# Patient Record
Sex: MALE | Race: WHITE
[De-identification: names, ages, dates, MRNs, and addresses within clinical notes are randomized per-mention and may not be internally consistent; named-entity substitution may affect disease eponyms.]

---

## 2017-07-11 ENCOUNTER — HOSPITAL ENCOUNTER (EMERGENCY)
Dept: HOSPITAL 26 - MED | Age: 37
Discharge: HOME | End: 2017-07-11
Payer: COMMERCIAL

## 2017-07-11 VITALS — DIASTOLIC BLOOD PRESSURE: 75 MMHG | SYSTOLIC BLOOD PRESSURE: 117 MMHG

## 2017-07-11 VITALS — HEIGHT: 67 IN | BODY MASS INDEX: 49.44 KG/M2 | WEIGHT: 315 LBS

## 2017-07-11 VITALS — SYSTOLIC BLOOD PRESSURE: 105 MMHG | DIASTOLIC BLOOD PRESSURE: 61 MMHG

## 2017-07-11 DIAGNOSIS — E11.622: ICD-10-CM

## 2017-07-11 DIAGNOSIS — I10: ICD-10-CM

## 2017-07-11 DIAGNOSIS — L97.529: Primary | ICD-10-CM

## 2017-07-11 PROCEDURE — 73660 X-RAY EXAM OF TOE(S): CPT

## 2017-07-11 PROCEDURE — 99284 EMERGENCY DEPT VISIT MOD MDM: CPT

## 2017-07-11 PROCEDURE — 96372 THER/PROPH/DIAG INJ SC/IM: CPT

## 2017-07-11 NOTE — NUR
37Y/M PATIENT PRESENTS TO ED WITH C/O BOTH FEET PAIN  . PT STATES HAVING 
CHRONIC ULCER AND PAIN TO BOTH FEET, HX. OBESITY, DM; SKIN IS PINK/WARM/DRY, 
BOTH FEET ULCER; AAOX4 WITH EVEN AND STEADY GAIT USING CAZARES; LUNGS CLEAR BL; HR 
EVEN AND REGULAR; PT DENIES ANY FEVER, CP, SOB, OR COUGH AT THIS TIME; PATIENT 
STATES PAIN OF 7/10 AT THIS TIME; VSS; PATIENT POSITIONED FOR COMFORT; HOB 
ELEVATED; BEDRAILS UP X2; BED DOWN. ER MD MADE AWARE OF PT STATUS.

## 2018-02-01 ENCOUNTER — HOSPITAL ENCOUNTER (EMERGENCY)
Dept: HOSPITAL 26 - MED | Age: 38
LOS: 1 days | Discharge: HOME | End: 2018-02-02
Payer: COMMERCIAL

## 2018-02-01 VITALS — SYSTOLIC BLOOD PRESSURE: 147 MMHG | DIASTOLIC BLOOD PRESSURE: 69 MMHG

## 2018-02-01 VITALS — WEIGHT: 315 LBS | BODY MASS INDEX: 47.74 KG/M2 | HEIGHT: 68 IN

## 2018-02-01 DIAGNOSIS — E11.9: ICD-10-CM

## 2018-02-01 DIAGNOSIS — M25.551: ICD-10-CM

## 2018-02-01 DIAGNOSIS — R39.15: ICD-10-CM

## 2018-02-01 DIAGNOSIS — Z79.4: ICD-10-CM

## 2018-02-01 DIAGNOSIS — R35.0: ICD-10-CM

## 2018-02-01 DIAGNOSIS — Z79.899: ICD-10-CM

## 2018-02-01 DIAGNOSIS — I10: ICD-10-CM

## 2018-02-01 DIAGNOSIS — R30.0: Primary | ICD-10-CM

## 2018-02-01 DIAGNOSIS — Z79.84: ICD-10-CM

## 2018-02-01 NOTE — NUR
PATIENT IS A 38 Y/O MALE WHO PRESENTS TO THE ED C/O URINARY BURNING. PT STATES, 
"MY DOCTOR SAYS I HAVE AN INFECTION." REPORTS HAVING A HARD TIME PEEING. PT 
REPORTS 10/10 SHARP RIGHT HIP PAIN THAT DOES NOT RADIATE. PT DENIES CP, SOB, 
N/V/D. PT DENIES CP, SOB, N/V/D. PT AAOX4, RR EVEN/UNLABORED. PT REPOSITIONED 
FOR COMFORT, BED IN LOWEST POSITION. ER MD DR. NUÑEZ NOTIFIED. WILL CONTINUE 
TO MONITOR.

## 2018-02-02 VITALS — DIASTOLIC BLOOD PRESSURE: 72 MMHG | SYSTOLIC BLOOD PRESSURE: 139 MMHG

## 2018-02-02 LAB
AMORPH SED URNS QL MICRO: (no result) /HPF
APPEARANCE UR: (no result)
BILIRUB UR QL STRIP: NEGATIVE
COLOR UR: YELLOW
GLUCOSE UR STRIP-MCNC: NEGATIVE MG/DL
HGB UR QL STRIP: NEGATIVE
LEUKOCYTE ESTERASE UR QL STRIP: (no result)
NITRITE UR QL STRIP: NEGATIVE
PH UR STRIP: 5.5 [PH] (ref 5–9)
RBC #/AREA URNS HPF: (no result) /HPF (ref 0–5)
WBC,URINE: (no result) /HPF (ref 0–5)

## 2018-02-02 NOTE — NUR
Patient discharged with v/s stable. Written and verbal after care instructions 
given and explained. Patient alert, oriented and verbalized understanding of 
instructions. Ambulatory with steady gait. All questions addressed prior to 
discharge. ID band removed. Patient advised to follow up with PMD. Rx of CIPRO, 
OMEPRAZOLE AND NORCO given. Patient educated on indication of medication 
including possible reaction and side effects. Opportunity to ask questions 
provided and answered.

## 2018-05-25 ENCOUNTER — HOSPITAL ENCOUNTER (EMERGENCY)
Dept: HOSPITAL 26 - MED | Age: 38
Discharge: HOME | End: 2018-05-25
Payer: COMMERCIAL

## 2018-05-25 VITALS — BODY MASS INDEX: 52.48 KG/M2 | WEIGHT: 315 LBS | HEIGHT: 65 IN

## 2018-05-25 VITALS — SYSTOLIC BLOOD PRESSURE: 117 MMHG | DIASTOLIC BLOOD PRESSURE: 68 MMHG

## 2018-05-25 VITALS — SYSTOLIC BLOOD PRESSURE: 111 MMHG | DIASTOLIC BLOOD PRESSURE: 65 MMHG

## 2018-05-25 DIAGNOSIS — Z79.899: ICD-10-CM

## 2018-05-25 DIAGNOSIS — L03.116: Primary | ICD-10-CM

## 2018-05-25 DIAGNOSIS — I10: ICD-10-CM

## 2018-05-25 DIAGNOSIS — Z79.84: ICD-10-CM

## 2018-05-25 DIAGNOSIS — Z79.4: ICD-10-CM

## 2018-05-25 DIAGNOSIS — E11.9: ICD-10-CM

## 2018-05-25 PROCEDURE — 99283 EMERGENCY DEPT VISIT LOW MDM: CPT

## 2018-05-25 PROCEDURE — 96372 THER/PROPH/DIAG INJ SC/IM: CPT

## 2018-08-20 ENCOUNTER — HOSPITAL ENCOUNTER (EMERGENCY)
Dept: HOSPITAL 26 - MED | Age: 38
Discharge: HOME | End: 2018-08-20
Payer: COMMERCIAL

## 2018-08-20 VITALS — SYSTOLIC BLOOD PRESSURE: 119 MMHG | DIASTOLIC BLOOD PRESSURE: 62 MMHG

## 2018-08-20 VITALS — BODY MASS INDEX: 53.78 KG/M2 | HEIGHT: 64 IN | WEIGHT: 315 LBS

## 2018-08-20 VITALS — SYSTOLIC BLOOD PRESSURE: 108 MMHG | DIASTOLIC BLOOD PRESSURE: 66 MMHG

## 2018-08-20 DIAGNOSIS — I10: ICD-10-CM

## 2018-08-20 DIAGNOSIS — N39.0: Primary | ICD-10-CM

## 2018-08-20 DIAGNOSIS — E11.9: ICD-10-CM

## 2018-08-20 DIAGNOSIS — Z79.899: ICD-10-CM

## 2018-08-20 DIAGNOSIS — Z79.1: ICD-10-CM

## 2018-08-20 LAB
BARBITURATES UR QL SCN: (no result) NG/ML
BENZODIAZ UR QL SCN: (no result) NG/ML
BZE UR QL SCN: (no result) NG/ML
CANNABINOIDS UR QL SCN: (no result) NG/ML
OPIATES UR QL SCN: (no result) NG/ML
PCP UR QL SCN: (no result) NG/ML

## 2018-08-20 PROCEDURE — 87491 CHLMYD TRACH DNA AMP PROBE: CPT

## 2018-08-20 PROCEDURE — 80305 DRUG TEST PRSMV DIR OPT OBS: CPT

## 2018-08-20 PROCEDURE — 96372 THER/PROPH/DIAG INJ SC/IM: CPT

## 2018-08-20 PROCEDURE — 99284 EMERGENCY DEPT VISIT MOD MDM: CPT

## 2018-08-20 PROCEDURE — 81002 URINALYSIS NONAUTO W/O SCOPE: CPT

## 2018-08-20 PROCEDURE — 36415 COLL VENOUS BLD VENIPUNCTURE: CPT

## 2018-08-20 NOTE — NUR
Patient discharged with v/s stable. Written and verbal after care instructions 
given and explained. 

Patient alert, oriented and verbalized understanding of instructions. Wheel 
Chair Assisted with to car. All questions addressed prior to discharge. ID band 
removed. Patient advised to follow up with PMD. Rx of CIPRO 500 MG, PYRIDIUM 
100 MG given. Patient educated on indication of medication including possible 
reaction and side effects. Opportunity to ask questions provided and answered.

## 2018-08-20 NOTE — NUR
PT BIB TO ED WITH C/O DYSURIA. PT STATES PAINFUL URINATION X 1 WK. NO FEVER. 
AAO X4, AMBULATORY VIA W/C ASSIST. SKIN WARM/PINK/DRY. ABDOMEN ROUND, SOFT, NON 
DISTENDED, ACTIVE BOWEL SOUND X4. BLADDER PAIN, UNABLE TO URIANTE AT THIS TIME. 
VSS, ER MD MADE AWARE OF PT STATUS. WILL CONTINUE TO MONITOR.

## 2018-10-25 ENCOUNTER — HOSPITAL ENCOUNTER (EMERGENCY)
Dept: HOSPITAL 26 - MED | Age: 38
Discharge: HOME | End: 2018-10-25
Payer: COMMERCIAL

## 2018-10-25 VITALS — DIASTOLIC BLOOD PRESSURE: 76 MMHG | SYSTOLIC BLOOD PRESSURE: 128 MMHG

## 2018-10-25 VITALS — SYSTOLIC BLOOD PRESSURE: 122 MMHG | DIASTOLIC BLOOD PRESSURE: 73 MMHG

## 2018-10-25 VITALS — HEIGHT: 65 IN | WEIGHT: 315 LBS | BODY MASS INDEX: 52.48 KG/M2

## 2018-10-25 DIAGNOSIS — I50.9: ICD-10-CM

## 2018-10-25 DIAGNOSIS — S00.93XA: Primary | ICD-10-CM
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
## 2018-10-25 DIAGNOSIS — E11.9: ICD-10-CM

## 2018-10-25 DIAGNOSIS — S20.219A: ICD-10-CM
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
## 2018-10-25 DIAGNOSIS — I11.0: ICD-10-CM

## 2018-10-25 DIAGNOSIS — Z79.899: ICD-10-CM

## 2018-10-25 DIAGNOSIS — Z79.1: ICD-10-CM

## 2019-01-07 ENCOUNTER — HOSPITAL ENCOUNTER (INPATIENT)
Dept: HOSPITAL 26 - MED | Age: 39
LOS: 2 days | Discharge: HOME HEALTH | DRG: 720 | End: 2019-01-09
Attending: GENERAL PRACTICE | Admitting: GENERAL PRACTICE
Payer: COMMERCIAL

## 2019-01-07 VITALS — DIASTOLIC BLOOD PRESSURE: 68 MMHG | SYSTOLIC BLOOD PRESSURE: 110 MMHG

## 2019-01-07 VITALS — WEIGHT: 315 LBS | HEIGHT: 67 IN | BODY MASS INDEX: 49.44 KG/M2

## 2019-01-07 VITALS — SYSTOLIC BLOOD PRESSURE: 177 MMHG | DIASTOLIC BLOOD PRESSURE: 70 MMHG

## 2019-01-07 DIAGNOSIS — L60.0: ICD-10-CM

## 2019-01-07 DIAGNOSIS — A41.9: Primary | ICD-10-CM

## 2019-01-07 DIAGNOSIS — D68.59: ICD-10-CM

## 2019-01-07 DIAGNOSIS — E05.90: ICD-10-CM

## 2019-01-07 DIAGNOSIS — E43: ICD-10-CM

## 2019-01-07 DIAGNOSIS — I70.203: ICD-10-CM

## 2019-01-07 DIAGNOSIS — L03.031: ICD-10-CM

## 2019-01-07 DIAGNOSIS — Z79.84: ICD-10-CM

## 2019-01-07 DIAGNOSIS — E11.69: ICD-10-CM

## 2019-01-07 DIAGNOSIS — I16.0: ICD-10-CM

## 2019-01-07 DIAGNOSIS — E66.01: ICD-10-CM

## 2019-01-07 DIAGNOSIS — I11.0: ICD-10-CM

## 2019-01-07 DIAGNOSIS — D50.9: ICD-10-CM

## 2019-01-07 DIAGNOSIS — I50.43: ICD-10-CM

## 2019-01-07 DIAGNOSIS — E83.42: ICD-10-CM

## 2019-01-07 DIAGNOSIS — L03.115: ICD-10-CM

## 2019-01-07 DIAGNOSIS — F11.10: ICD-10-CM

## 2019-01-07 DIAGNOSIS — L97.529: ICD-10-CM

## 2019-01-07 DIAGNOSIS — Z79.4: ICD-10-CM

## 2019-01-07 DIAGNOSIS — Z82.49: ICD-10-CM

## 2019-01-07 DIAGNOSIS — F15.10: ICD-10-CM

## 2019-01-07 DIAGNOSIS — Z79.899: ICD-10-CM

## 2019-01-07 DIAGNOSIS — Z83.3: ICD-10-CM

## 2019-01-07 DIAGNOSIS — E78.5: ICD-10-CM

## 2019-01-07 DIAGNOSIS — E11.65: ICD-10-CM

## 2019-01-07 LAB
ALBUMIN FLD-MCNC: 2.5 G/DL (ref 3.4–5)
AMYLASE SERPL-CCNC: 18 U/L (ref 25–115)
ANION GAP SERPL CALCULATED.3IONS-SCNC: 8.6 MMOL/L (ref 8–16)
AST SERPL-CCNC: 43 U/L (ref 15–37)
BILIRUB SERPL-MCNC: 0.4 MG/DL (ref 0–1)
BUN SERPL-MCNC: 23 MG/DL (ref 7–18)
CHLORIDE SERPL-SCNC: 99 MMOL/L (ref 98–107)
CO2 SERPL-SCNC: 27.9 MMOL/L (ref 21–32)
CREAT SERPL-MCNC: 1.3 MG/DL (ref 0.7–1.3)
EOSINOPHIL NFR BLD MANUAL: 1 % (ref 0–4)
ERYTHROCYTE [DISTWIDTH] IN BLOOD BY AUTOMATED COUNT: 15.2 % (ref 11.6–13.7)
GFR SERPL CREATININE-BSD FRML MDRD: 80 ML/MIN (ref 90–?)
GLUCOSE SERPL-MCNC: 371 MG/DL (ref 74–106)
HCT VFR BLD AUTO: 30.5 % (ref 36–52)
HGB BLD-MCNC: 9.3 G/DL (ref 12–18)
IRON SERPL-MCNC: 16 UG/DL (ref 35–150)
LIPASE SERPL-CCNC: 150 U/L (ref 73–393)
LYMPHOCYTES NFR BLD MANUAL: 6 % (ref 20–46)
MAGNESIUM SERPL-MCNC: 1.7 MG/DL (ref 1.8–2.4)
MCH RBC QN AUTO: 25 PG (ref 27–31)
MCHC RBC AUTO-ENTMCNC: 31 G/DL (ref 33–37)
MCV RBC AUTO: 82.5 FL (ref 80–94)
MONOCYTES NFR BLD MANUAL: 1 % (ref 5–12)
PHOSPHATE SERPL-MCNC: 2.9 MG/DL (ref 2.5–4.9)
PLATELET # BLD AUTO: 186 K/UL (ref 140–450)
POTASSIUM SERPL-SCNC: 4.5 MMOL/L (ref 3.5–5.1)
PROTHROMBIN TIME: 10.8 SECS (ref 10.8–13.4)
RBC # BLD AUTO: 3.69 MIL/UL (ref 4.2–6.1)
SODIUM SERPL-SCNC: 131 MMOL/L (ref 136–145)
TIBC SERPL-MCNC: 244 UG/DL (ref 250–450)
TSH SERPL DL<=0.05 MIU/L-ACNC: 0.27 UIU/ML (ref 0.34–3.74)
WBC # BLD AUTO: 17 K/UL (ref 4.8–10.8)

## 2019-01-07 RX ADMIN — HYDROCODONE BITARTRATE AND ACETAMINOPHEN PRN TAB: 5; 325 TABLET ORAL at 21:18

## 2019-01-07 RX ADMIN — DEXTROSE SCH MLS/HR: 5 SOLUTION INTRAVENOUS at 17:00

## 2019-01-07 RX ADMIN — SODIUM CHLORIDE SCH MLS/HR: 9 INJECTION, SOLUTION INTRAVENOUS at 22:05

## 2019-01-07 RX ADMIN — Medication SCH DEV: at 21:33

## 2019-01-07 RX ADMIN — DEXTROSE SCH MLS/HR: 5 SOLUTION INTRAVENOUS at 16:42

## 2019-01-07 RX ADMIN — DEXTROSE SCH MLS/HR: 5 SOLUTION INTRAVENOUS at 16:44

## 2019-01-07 NOTE — NUR
38/M BIB FAMILY C/O RIGHT LOWER LEG  REDNESS & SWELLING X 4 DAYS. DENIES 
INJURY.DENIES N/V/D; SKIN IS PINK/WARM/DRY; AAOX4 , AMB WITH CANE. PATIENT 
STATES RIGHT LEG PAIN OF 9/10 AT THIS TIME. PATIENT POSITIONED FOR COMFORT; RLL 
ELEVATED; BEDRAILS UP X2; BED DOWN. ER MD MADE AWARE OF PT STATUS.

## 2019-01-07 NOTE — NUR
RECEIVED FROM ER VIA amBXCHON. PT WITH R LOWER EXTREMITY CELLULITIS, RED AND SWOLLEN. PT 
MORBID OBESITY, PT AMBULATORY WITH CANE. WITH ASSISTANCE DUE TO PAIN ON R LE. FALL RISK DUE 
TO PAIN IN R LE. PLACED IN LOW BED POSITION, CALL LIGHT W/IN REACH. ORIENTED TO UNIT. PT 
VERBALIZED UNDERSTANDING.

## 2019-01-07 NOTE — NUR
C/O BL LOWER LEG SWELLING/PAIN X 4 DAYS. DENIES INJURY. PT AMBULATES WITH A 
CANE

HX; DM, HTN

RX; LASIX, INSULIN

## 2019-01-07 NOTE — NUR
WAS INFORMED BY PHARMACIST THAT FERRITIN GLUCONATE HAS TO BE PREPARED, THAT IT CAN'T BE 
GIVEN TONIGHT. DR. ANDREW AWARE

## 2019-01-07 NOTE — NUR
PT HAS HARD STICK; NOTIFIED DR ARTEAGA.

-------------------------------------------------------------------------------

Addendum: 01/07/19 at 1802 by [email or web address]

-------------------------------------------------------------------------------

TRIED MULTIPLE TIMES.

## 2019-01-07 NOTE — NUR
Patient will be admitted to care of DR SHEEHAN . Admited to MED SURG.  Will go to 
[id number]. Belongings list completed.  Report to ISHAN SHAFFER.

## 2019-01-07 NOTE — NUR
PT ON 10/10 R LE PAIN. PT SAID THAT NORCO IS OK EVEN WITRH 10/10 PAIN. HE DOES NOT WANT 
HIGHER PAIN MED FOR NOW. WILL INFORM DR, HE SAID IF HE NEEDS MORE PAIN MED.

## 2019-01-08 VITALS — DIASTOLIC BLOOD PRESSURE: 60 MMHG | SYSTOLIC BLOOD PRESSURE: 106 MMHG

## 2019-01-08 VITALS — SYSTOLIC BLOOD PRESSURE: 112 MMHG | DIASTOLIC BLOOD PRESSURE: 71 MMHG

## 2019-01-08 VITALS — SYSTOLIC BLOOD PRESSURE: 106 MMHG | DIASTOLIC BLOOD PRESSURE: 61 MMHG

## 2019-01-08 VITALS — SYSTOLIC BLOOD PRESSURE: 105 MMHG | DIASTOLIC BLOOD PRESSURE: 56 MMHG

## 2019-01-08 LAB
ANION GAP SERPL CALCULATED.3IONS-SCNC: 12 MMOL/L (ref 8–16)
BARBITURATES UR QL SCN: (no result) NG/ML
BASOPHILS # BLD AUTO: 0 K/UL (ref 0–0.22)
BASOPHILS NFR BLD AUTO: 0.2 % (ref 0–2)
BENZODIAZ UR QL SCN: (no result) NG/ML
BUN SERPL-MCNC: 18 MG/DL (ref 7–18)
BZE UR QL SCN: (no result) NG/ML
CANNABINOIDS UR QL SCN: (no result) NG/ML
CHLORIDE SERPL-SCNC: 99 MMOL/L (ref 98–107)
CHOLEST/HDLC SERPL: 7.3 {RATIO} (ref 1–4.5)
CO2 SERPL-SCNC: 28.5 MMOL/L (ref 21–32)
CREAT SERPL-MCNC: 1.1 MG/DL (ref 0.7–1.3)
EOSINOPHIL # BLD AUTO: 0.2 K/UL (ref 0–0.4)
EOSINOPHIL NFR BLD AUTO: 1.4 % (ref 0–4)
ERYTHROCYTE [DISTWIDTH] IN BLOOD BY AUTOMATED COUNT: 15.3 % (ref 11.6–13.7)
FERRITIN SERPL-MCNC: 125 NG/ML (ref 30–400)
FOLATE SERPL-MCNC: 7 NG/ML (ref 3–?)
GFR SERPL CREATININE-BSD FRML MDRD: 97 ML/MIN (ref 90–?)
GLUCOSE SERPL-MCNC: 226 MG/DL (ref 74–106)
HCT VFR BLD AUTO: 29 % (ref 36–52)
HDLC SERPL-MCNC: 15 MG/DL (ref 40–60)
HGB BLD-MCNC: 9 G/DL (ref 12–18)
LDLC SERPL CALC-MCNC: 63 MG/DL (ref 60–100)
LYMPHOCYTES # BLD AUTO: 2 K/UL (ref 2–11.5)
LYMPHOCYTES NFR BLD AUTO: 16.1 % (ref 20.5–51.1)
MAGNESIUM SERPL-MCNC: 1.7 MG/DL (ref 1.8–2.4)
MCH RBC QN AUTO: 25 PG (ref 27–31)
MCHC RBC AUTO-ENTMCNC: 31 G/DL (ref 33–37)
MCV RBC AUTO: 82.1 FL (ref 80–94)
MONOCYTES # BLD AUTO: 0.9 K/UL (ref 0.8–1)
MONOCYTES NFR BLD AUTO: 7.1 % (ref 1.7–9.3)
NEUTROPHILS # BLD AUTO: 9.3 K/UL (ref 1.8–7.7)
NEUTROPHILS NFR BLD AUTO: 75.2 % (ref 42.2–75.2)
OPIATES UR QL SCN: (no result) NG/ML
PCP UR QL SCN: (no result) NG/ML
PHOSPHATE SERPL-MCNC: 2.6 MG/DL (ref 2.5–4.9)
PLATELET # BLD AUTO: 174 K/UL (ref 140–450)
POTASSIUM SERPL-SCNC: 4.5 MMOL/L (ref 3.5–5.1)
RBC # BLD AUTO: 3.53 MIL/UL (ref 4.2–6.1)
SODIUM SERPL-SCNC: 135 MMOL/L (ref 136–145)
TRANSFERRIN SERPL-MCNC: 209 MG/DL (ref 200–370)
TRIGL SERPL-MCNC: 164 MG/DL (ref 30–150)
VIT B12 SERPL-MCNC: 425 PG/ML (ref 232–1245)
WBC # BLD AUTO: 12.4 K/UL (ref 4.8–10.8)

## 2019-01-08 RX ADMIN — MAGNESIUM OXIDE TAB 400 MG (241.3 MG ELEMENTAL MG) SCH MG: 400 (241.3 MG) TAB at 21:55

## 2019-01-08 RX ADMIN — HYDROCODONE BITARTRATE AND ACETAMINOPHEN PRN TAB: 5; 325 TABLET ORAL at 06:46

## 2019-01-08 RX ADMIN — Medication SCH DEV: at 21:46

## 2019-01-08 RX ADMIN — Medication SCH DEV: at 05:38

## 2019-01-08 RX ADMIN — Medication SCH EACH: at 09:40

## 2019-01-08 RX ADMIN — LISINOPRIL SCH MG: 10 TABLET ORAL at 09:40

## 2019-01-08 RX ADMIN — Medication SCH DEV: at 16:30

## 2019-01-08 RX ADMIN — FUROSEMIDE SCH MG: 10 INJECTION, SOLUTION INTRAMUSCULAR; INTRAVENOUS at 09:40

## 2019-01-08 RX ADMIN — SODIUM CHLORIDE SCH MLS/HR: 9 INJECTION, SOLUTION INTRAVENOUS at 06:16

## 2019-01-08 RX ADMIN — INSULIN LISPRO PRN UNITS: 100 INJECTION, SOLUTION INTRAVENOUS; SUBCUTANEOUS at 05:43

## 2019-01-08 RX ADMIN — INSULIN LISPRO PRN UNITS: 100 INJECTION, SOLUTION INTRAVENOUS; SUBCUTANEOUS at 18:32

## 2019-01-08 RX ADMIN — Medication SCH DEV: at 11:30

## 2019-01-08 RX ADMIN — INSULIN LISPRO PRN UNITS: 100 INJECTION, SOLUTION INTRAVENOUS; SUBCUTANEOUS at 12:49

## 2019-01-08 RX ADMIN — INSULIN LISPRO PRN UNITS: 100 INJECTION, SOLUTION INTRAVENOUS; SUBCUTANEOUS at 21:52

## 2019-01-08 RX ADMIN — ASPIRIN TAB DELAYED RELEASE 81 MG SCH MG: 81 TABLET DELAYED RESPONSE at 09:40

## 2019-01-08 RX ADMIN — METHADONE HYDROCHLORIDE SCH MG: 10 TABLET ORAL at 13:13

## 2019-01-08 RX ADMIN — DEXTROSE SCH MLS/HR: 5 SOLUTION INTRAVENOUS at 17:38

## 2019-01-08 RX ADMIN — DEXTROSE SCH MLS/HR: 5 SOLUTION INTRAVENOUS at 11:15

## 2019-01-08 NOTE — NUR
PT FOUND AMBULATING TO RESTROOM ON HIS OWN. I REENFORCED TEACHING ON THE IMPORTANCE OF HIM 
USING HIS CALL LIGHT TO CALL FOR HELP WHEN GETTING OUT OF BED DUE TO HIS UNSTEADINESS. PT 
VERBALIZED UNDERSTANDING. URINAL AT BEDSIDE FOR EASE OF VOIDING. ALL OTHER NEEDS MET AT THIS 
TIME. WILL CONTINUE TO ROUND FREQUENTLY ON PT. CALL LIGHT WITHIN REACH, BED IN LOW POSITION.

## 2019-01-08 NOTE — NUR
PATIENT HAS BEEN SCREENED AND CATEGORIZED AS HIGH NUTRITION RISK. PATIENT WILL BE SEEN 
WITHIN 1-2 DAYS OF ADMISSION.



01/08/19-01/09/19



CARMEN KELLEY RD

## 2019-01-08 NOTE — NUR
PT C/O OF 9/10 PAIN. DR. ANDREW AWARE NORCO GIVEN DESPITE PAIN INTENSITY. PT SAID THAT NORCO 
SUFFICIENT FOR HIM AND THAT HE FEELS PAIN ONLY WHEN HE TRIES TO AMBULATE. HAD PAIN WHEN HE 
ATTEMPTED TO PUT WEIGHT ON R LEG.

## 2019-01-08 NOTE — NUR
RECEIVED REPORT FROM NIGHT SHIFT NURSE. PT WITH R LOWER EXTREMITY CELLULITIS, RED AND 
SWOLLEN. PT MORBIDLY OBESE. LEFT FA 24G PATENT AND INTACT. PT AMBULATORY WITH CANE WITH 
ASSISTANCE DUE TO PAIN ON R LE. FALL RISK DUE TO PAIN IN R LE. PLACED IN LOW BED POSITION, 
CALL LIGHT W/IN REACH. PT VERBALIZED UNDERSTANDING.

## 2019-01-08 NOTE — NUR
PT SLEEPING IN BED. NO SIGNS OF DISTRESS NOTED. WILL CONTINUE TO MONITOR CLOSELY. CALL LIGHT 
WITHIN REACH, BED IN LOWEST POSITION.

## 2019-01-08 NOTE — NUR
PT SLEEPING IN BED. NO SIGNS OF DISTRESS NOTED AT THIS TIME. WILL CONTINUE TO ROUND 
FREQUENTLY. CALL LIGHT WITHIN REACH, BED IN LOW POSITION.

## 2019-01-08 NOTE — NUR
PT RESTING IN BED WATCHING TV. NO COMPLAINS OF PAIN AT THIS TIME. PT RUNNING VANCOMYCIN AND 
TOLERATING WELL. WILL ROUND FREQUENTLY. CALL LIGHT WITHIN REACH.

## 2019-01-08 NOTE — NUR
RECEIVED BEDSIDE REPORT FROM A PT WITH R LOWER EXTREMITY CELLULITIS, RED AND SWOLLEN.NO 
COMPLAINTS AT THIS TIME. PT MORBID OBESITY, PT PT LYING ON BED THIS TIME.INFORMED PT TO CALL 
NURSE IF WANTING TO AMBULATE,PT VERBALIZED UNDERSTANDING. FALL RISK DUE TO PAIN IN R LE. 
PLACED IN LOW BED POSITION, CALL LIGHT W/IN REACH.

## 2019-01-08 NOTE — NUR
1/8/19 RD INITIAL ASSESSMENT COMPLETED



PLEASE REFER TO NUTRITION ASSESSMENT UNDER CARE ACTIVITY FOR ESTIMATED NUTRITIONAL NEEDS. 



1. CONTINUE CARDIAC 60 GM CCHO DIET AS TOLERATED 

2. DIETITIAN PROVIDED NUTRITION EDUCATION ON A CARDIAC HEALTHY DIET

3. RD TO FOLLOW-UP 3-5 DAYS, MODERATE RISK 



CARMEN KELLEY RD

## 2019-01-09 VITALS — DIASTOLIC BLOOD PRESSURE: 60 MMHG | SYSTOLIC BLOOD PRESSURE: 105 MMHG

## 2019-01-09 VITALS — SYSTOLIC BLOOD PRESSURE: 116 MMHG | DIASTOLIC BLOOD PRESSURE: 61 MMHG

## 2019-01-09 VITALS — SYSTOLIC BLOOD PRESSURE: 106 MMHG | DIASTOLIC BLOOD PRESSURE: 61 MMHG

## 2019-01-09 LAB
ANION GAP SERPL CALCULATED.3IONS-SCNC: 14.9 MMOL/L (ref 8–16)
APPEARANCE UR: CLEAR
BASOPHILS # BLD AUTO: 0 K/UL (ref 0–0.22)
BASOPHILS NFR BLD AUTO: 0.2 % (ref 0–2)
BILIRUB UR QL STRIP: NEGATIVE
BUN SERPL-MCNC: 15 MG/DL (ref 7–18)
CHLORIDE SERPL-SCNC: 98 MMOL/L (ref 98–107)
CO2 SERPL-SCNC: 27.8 MMOL/L (ref 21–32)
COLOR UR: YELLOW
CREAT SERPL-MCNC: 1.1 MG/DL (ref 0.7–1.3)
EOSINOPHIL # BLD AUTO: 0.1 K/UL (ref 0–0.4)
EOSINOPHIL NFR BLD AUTO: 1 % (ref 0–4)
ERYTHROCYTE [DISTWIDTH] IN BLOOD BY AUTOMATED COUNT: 15.4 % (ref 11.6–13.7)
GFR SERPL CREATININE-BSD FRML MDRD: 97 ML/MIN (ref 90–?)
GLUCOSE SERPL-MCNC: 216 MG/DL (ref 74–106)
GLUCOSE UR STRIP-MCNC: NEGATIVE MG/DL
HCT VFR BLD AUTO: 32.1 % (ref 36–52)
HGB BLD-MCNC: 9.8 G/DL (ref 12–18)
HGB UR QL STRIP: NEGATIVE
LEUKOCYTE ESTERASE UR QL STRIP: NEGATIVE
LYMPHOCYTES # BLD AUTO: 3.1 K/UL (ref 2–11.5)
LYMPHOCYTES NFR BLD AUTO: 22.6 % (ref 20.5–51.1)
MAGNESIUM SERPL-MCNC: 1.7 MG/DL (ref 1.8–2.4)
MCH RBC QN AUTO: 25 PG (ref 27–31)
MCHC RBC AUTO-ENTMCNC: 31 G/DL (ref 33–37)
MCV RBC AUTO: 82.8 FL (ref 80–94)
MONOCYTES # BLD AUTO: 0.9 K/UL (ref 0.8–1)
MONOCYTES NFR BLD AUTO: 6.7 % (ref 1.7–9.3)
NEUTROPHILS # BLD AUTO: 9.7 K/UL (ref 1.8–7.7)
NEUTROPHILS NFR BLD AUTO: 69.5 % (ref 42.2–75.2)
NITRITE UR QL STRIP: NEGATIVE
PH UR STRIP: 6.5 [PH] (ref 5–9)
PHOSPHATE SERPL-MCNC: 3.1 MG/DL (ref 2.5–4.9)
PLATELET # BLD AUTO: 235 K/UL (ref 140–450)
POTASSIUM SERPL-SCNC: 4.7 MMOL/L (ref 3.5–5.1)
RBC # BLD AUTO: 3.88 MIL/UL (ref 4.2–6.1)
SODIUM SERPL-SCNC: 136 MMOL/L (ref 136–145)
WBC # BLD AUTO: 13.9 K/UL (ref 4.8–10.8)

## 2019-01-09 RX ADMIN — Medication SCH DEV: at 06:48

## 2019-01-09 RX ADMIN — FUROSEMIDE SCH MG: 10 INJECTION, SOLUTION INTRAMUSCULAR; INTRAVENOUS at 08:41

## 2019-01-09 RX ADMIN — INSULIN LISPRO PRN UNITS: 100 INJECTION, SOLUTION INTRAVENOUS; SUBCUTANEOUS at 06:49

## 2019-01-09 RX ADMIN — SODIUM CHLORIDE SCH MLS/HR: 9 INJECTION, SOLUTION INTRAVENOUS at 06:16

## 2019-01-09 RX ADMIN — Medication SCH DEV: at 11:30

## 2019-01-09 RX ADMIN — Medication SCH EACH: at 08:42

## 2019-01-09 RX ADMIN — MAGNESIUM OXIDE TAB 400 MG (241.3 MG ELEMENTAL MG) SCH MG: 400 (241.3 MG) TAB at 08:42

## 2019-01-09 RX ADMIN — ASPIRIN TAB DELAYED RELEASE 81 MG SCH MG: 81 TABLET DELAYED RESPONSE at 08:42

## 2019-01-09 RX ADMIN — DEXTROSE SCH MLS/HR: 5 SOLUTION INTRAVENOUS at 11:22

## 2019-01-09 RX ADMIN — INSULIN LISPRO PRN UNITS: 100 INJECTION, SOLUTION INTRAVENOUS; SUBCUTANEOUS at 13:34

## 2019-01-09 RX ADMIN — DEXTROSE SCH MLS/HR: 5 SOLUTION INTRAVENOUS at 02:43

## 2019-01-09 RX ADMIN — LISINOPRIL SCH MG: 10 TABLET ORAL at 08:44

## 2019-01-09 RX ADMIN — HYDROCODONE BITARTRATE AND ACETAMINOPHEN PRN TAB: 5; 325 TABLET ORAL at 11:14

## 2019-01-09 RX ADMIN — METHADONE HYDROCHLORIDE SCH MG: 10 TABLET ORAL at 12:24

## 2019-01-09 NOTE — NUR
LAB WAS HERE, TRIED TO GET VANCO TROUGH BUT FAILED, 4X PT WAS POKED, UNTIL PT C/O OF PAIN 
PER .  CALLED TO LET NURSE KNOW. DR. ANDREW AWARE. WILL WAIT FOR VANCO 
TROUGH BEFORE VANCO GIVEN PER DR. ANDREW

## 2019-01-09 NOTE — NUR
ENDORSED REPORT TO AM SHIFT NURSE FOR CONTINUITY OF CARE. PT NO COMPLAINTS AT THIS TIME, IN 
STABLE CONDITION.

## 2019-01-09 NOTE — NUR
RECEIVED REPORT FROM NIGHT SHIFT NURSE. PT SLEEPING IN BED. REINTRODUCED SELF TO PT. PT WITH 
R LOWER EXTREMITY CELLULITIS, RED AND SWOLLEN. PT MORBIDLY OBESE. LEFT FA 24G PATENT AND 
INTACT. PT AMBULATORY WITH CANE WITH ASSISTANCE DUE TO PAIN ON R LE. FALL RISK DUE TO PAIN 
IN R LE. PLACED IN LOW BED POSITION, CALL LIGHT W/IN REACH. PT VERBALIZED UNDERSTANDING.

## 2019-01-09 NOTE — NUR
PT SLEEPING IN BED. NO SIGNS OF DISTRESS NOTED. WILL CONTINUE TO ROUND FREQUENTLY. BED IN 
LOWEST POSITION, CALL LIGHT WITHIN REACH.

## 2019-01-09 NOTE — NUR
CALLED SU THE ADMITTING MANAGER AND LEFT A MESSAGE REGARDING PT'S CHANGE OF ADDRESS. PT 
ADDRESS IN DEMOGRAPHICS IS INCORRECT PER PT AND SISTER. [place], [address].

## 2019-01-09 NOTE — NUR
SPOKE WITH DR JAMES.  SHE IS ASKING FOR AUTH FOR DR. BURDICK TO SEE THE PATIENT.    I CALLED 
MAXIMO FROM [place], [phone].    SHE SAID TO CALL [facility], 
LUCI [phone].   I SPOKE WITH LUCI AND SHE SAID TO CALL JOHN [phone] 
.    I CALLED JOHN AND SHE SAID SHE CANNOT AUTHORIZE DR. BURDICK.   SHE SAID TO USE DR. FELICIA CANO, [phone].   SHE SAID THE AUTH FOR HIM WILL BE [id number].   SHE 
ASKED ME TO FAX THE FACE SHEET AND THE ORDER TO HER [phone].   I INFORMED DR. JAMES.  
 I RECEIVED A COPY OF THE AUTH AND GAVE IT TO DR. JAMES.

I SPOKE WITH MAXIMO FROM [place] ABOUT HOME HEALTH ORDER FOR SAFETY EVAL.   SHE SAID TO TRY 
THE HOME HEALTHS THAT THEY ARE CONTRACTED WITH.

CALLED [facility], NO

[facility]

CALLED [facility] AND FAXED ORDER AND FACE SHEET AND H&P TO THEM.

## 2019-01-09 NOTE — NUR
RECEIVED A CALL FROM [facility].   THE AUTH FOR CORY IS [id number].   I CALLED JANES AT 
[facility] AND SHE IS REVIEWING THE PACKET ON THIS PATIENT AND SHE WILL LET ME KNOW IF THEY 
CAN ACCEPT THE PATIENT.

-------------------------------------------------------------------------------

Addendum: 01/09/19 at 1726 by Gladis Pitts 

-------------------------------------------------------------------------------

THE AUTH FROM [facility] IS FOR ANY HOME HEALTH THAT CAN TAKE THE PATIENT.

## 2019-01-09 NOTE — NUR
ADMINISTERED MORNING MEDS TO PT. PT TOLERATED THEM WELL. NO OTHER NEEDS AT THIS MOMENT. PT 
EDUCATION REENFORCED ON THE IMPORTANCE OF CALLING FOR HELP WHEN GETTING OUT OF BED DUE TO 
UNSTEADINESS ON FEET. PT VERBALIZED UNDERSTANDING. WILL CONTINUE TO ROUND FREQUENTLY. CALL 
LIGHT WITHIN REACH. BED IN LOWEST POSITION FOR ICU BED.

## 2019-01-09 NOTE — NUR
PT PRESCRIPTION MEDIATIONS DELIVERED TO FLOOR BY [facility] [facility]. PRESCRIPTIONS 
AUTOMATICALLY TAKEN TO PHARMACY FOR SAFEKEEPING PER VERONICA, PHARMACIST, INSTRUCTIONS. 
WILL PICKUP MEDS WHEN PT IS DISCHARGED FOR PT TO TAKE HOME.

## 2019-01-09 NOTE — NUR
PER PT, HE FELL EVEN THOUGH HE WAS INSTRUCTED TO USE HIS CALL LIGHT IF NEEDED TO USE 
RESTROOM. URINAL WAS GIVEN FOR PT. PT FOUND ON THE FLOOR BY OTHER NURSE. PT VITAL SIGNS ARE 
STABLE. IV STILL INTACT AND PATENT. PT STATES THAT HE HURT HIS WRIST. DENIES HITTING HIS 
HEAD. NO LOSS OF CONSCIOUSNESS.  WAS CALLED TO BEDSIDE FOR FURTHER EVALUATION. PT 
ASSISTED BACK TO BED BY THREE NURSES. PT INSTRUCTED AGAIN TO USE THE CALL LIGHT. CALL LIGHT 
WITHIN REACH. WILL CONTINUE TO MONITOR PT.

## 2019-01-09 NOTE — NUR
RECEIVED CALL FROM [facility] THEY CANNOT TAKE THE PATIENT WITH THE IPA DANIEL JUNIOR.

I CALLED [facility], NO

CALLED [facility] NO

CALLED [facility], NOT TAKING [facility].

CALLED [facility], [phone].   FAXED INFORMATION AND ORDER TO THEM [phone].  
 JESSICA AT [facility] SAID THEY NEED AN AUTHORIZATION FROM [facility] FIRST.   CALLED [employer] AND 
SPOKE WITH MICHAEL

## 2019-01-09 NOTE — NUR
PT DISCHARGED HOME WITH HOME HEALTH. PT NOT ABLE TO SIGN DISCHARGE PAPERWORK DUE TO 
CONDITION. DISCHARGE EDUCATION GIVEN TO PT. PRESCRIPTION ALSO INCLUDED IN DISCHARGE FOLDER. 
DR. JAMES AT BEDSIDE DURING TEACHING. REMOVED IV, WITH TIP INTACT. CUT WRISTBANDS OFF PT. ALL 
BELONGINGS TAKEN WITH HIM. PT LEFT IN STABLE CONDITION, VERBALIZED UNDERSTANDING OF 
IMPORTANCE OF FOLLOWING UP WITH PCP, PEDIATRIST, AND ORTHO SPECIALIST FOR WRIST FRACTURE. 
ALSO VERBALIZED UNDERSTANDING ON MEDICATION REGIMEN. PER, PT HE ADMITS FAULT TO FALL EARLIER 
IN THE DAY. WHEELED PT OUT TO [employer] CAR. 

-------------------------------------------------------------------------------

Addendum: 01/09/19 at 2049 by Mildred Harrison RN

-------------------------------------------------------------------------------

PT DISCHARGED HOME WITH HOME HEALTH. PT NOT ABLE TO SIGN DISCHARGE PAPERWORK DUE TO 
CONDITION. DISCHARGE EDUCATION GIVEN TO PT. PRESCRIPTION ALSO INCLUDED IN DISCHARGE FOLDER. 
DR. JAMES AT BEDSIDE DURING TEACHING. REMOVED IV, WITH TIP INTACT. CUT WRISTBANDS OFF PT. ALL 
BELONGINGS TAKEN WITH HIM. PRESCRIPTIONS LEFT IN PHARMACY ALSO GIVEN TO PT. PT LEFT IN 
STABLE CONDITION, VERBALIZED UNDERSTANDING OF IMPORTANCE OF FOLLOWING UP WITH PCP, 
PEDIATRIST, AND ORTHO SPECIALIST FOR WRIST FRACTURE. ALSO VERBALIZED UNDERSTANDING ON 
MEDICATION REGIMEN. PER, PT HE ADMITS FAULT TO FALL EARLIER IN THE DAY. WHEELED PT OUT TO 
[employer] CAR.

## 2019-01-09 NOTE — NUR
PER PHARMACIST VANCO TROUGH MAX LEVEL IS 20, SO GO AHEAD WITH THE VANCO ADMINISTRATION.DR. ANDREW AWARE OF THE RESULT AND AWARE THAT VANCO IS GOING TO BE ADMINISTERED.

## 2019-01-09 NOTE — NUR
PT DAUGHTER AT BEDSIDE. PT VERBALIZES NO SOB OR DISTRESS. ALL NEEDS MET AT THIS TIME. CALL 
LIGHT WITHIN REACH, BED IN LOW POSITION.

## 2019-01-09 NOTE — NUR
RECEIVED A CALL FROM FROM [facility].   THEY HAVE NO NURSE TO SEE THIS PATIENT AND 
CANNOT ACCPET THE PATIENT.

## 2019-01-09 NOTE — NUR
CHART REVIEW DONE. [facility]  [address], [place], [place]    Authorization for Surgical Operation and Procedure                               I hereby authorize * Shelly Tilley  *, my physician and his/her assistants (if applicable), which may include medical students, residents, and/or fellows, to perform the following surgical operation/ procedure and administer such anesthesia as may be determined necessary by my physician: Operation/Procedure name  percutaneous liver biopsy   on Mica Bernabe   2.   I recognize that during the surgical operation/procedure, unforeseen conditions may necessitate additional or different procedures than those listed above.  I, therefore, further authorize and request that the above-named surgeon, assistants, or designees perform such procedures as are, in their judgment, necessary and desirable.    3.   My surgeon/physician has discussed prior to my surgery the potential benefits, risks and side effects of this procedure; the likelihood of achieving goals; and potential problems that might occur during recuperation.  They also discussed reasonable alternatives to the procedure, including risks, benefits, and side effects related to the alternatives and risks related to not receiving this procedure.  I have had all my questions answered and I acknowledge that no guarantee has been made as to the result that may be obtained.    4.   Should the need arise during my operation/procedure, which includes change of level of care prior to discharge, I also consent to the administration of blood and/or blood products.  Further, I understand that despite careful testing and screening of blood or blood products by collecting agencies, I may still be subject to ill effects as a result of receiving a blood transfusion and/or blood products.  The following are some, but not all, of the potential risks that can occur: fever and allergic reactions, hemolytic reactions, transmission of  diseases such as Hepatitis, AIDS and Cytomegalovirus (CMV) and fluid overload.  In the event that I wish to have an autologous transfusion of my own blood, or a directed donor transfusion, I will discuss this with my physician.  Check only if Refusing Blood or Blood Products  I understand refusal of blood or blood products as deemed necessary by my physician may have serious consequences to my condition to include possible death. I hereby assume responsibility for my refusal and release the hospital, its personnel, and my physicians from any responsibility for the consequences of my refusal.    o  Refuse   5.   I authorize the use of any specimen, organs, tissues, body parts or foreign objects that may be removed from my body during the operation/procedure for diagnosis, research or teaching purposes and their subsequent disposal by hospital authorities.  I also authorize the release of specimen test results and/or written reports to my treating physician on the hospital medical staff or other referring or consulting physicians involved in my care, at the discretion of the Pathologist or my treating physician.    6.   I consent to the photographing or videotaping of the operations or procedures to be performed, including appropriate portions of my body for medical, scientific, or educational purposes, provided my identity is not revealed by the pictures or by descriptive texts accompanying them.  If the procedure has been photographed/videotaped, the surgeon will obtain the original picture, image, videotape or CD.  The hospital will not be responsible for storage, release or maintenance of the picture, image, tape or CD.    7.   I consent to the presence of a  or observers in the operating room as deemed necessary by my physician or their designees.    8.   I recognize that in the event my procedure results in extended X-Ray/fluoroscopy time, I may develop a skin reaction.    9. If I have a Do Not  Attempt Resuscitation (DNAR) order in place, that status will be suspended while in the operating room, procedural suite, and during the recovery period unless otherwise explicitly stated by me (or a person authorized to consent on my behalf). The surgeon or my attending physician will determine when the applicable recovery period ends for purposes of reinstating the DNAR order.  10. Patients having a sterilization procedure: I understand that if the procedure is successful the results will be permanent and it will therefore be impossible for me to inseminate, conceive, or bear children.  I also understand that the procedure is intended to result in sterility, although the result has not been guaranteed.   11. I acknowledge that my physician has explained sedation/analgesia administration to me including the risk and benefits I consent to the administration of sedation/analgesia as may be necessary or desirable in the judgment of my physician.    I CERTIFY THAT I HAVE READ AND FULLY UNDERSTAND THE ABOVE CONSENT TO OPERATION and/or OTHER PROCEDURE.     ____________________________________  _________________________________        ______________________________  Signature of Patient    Signature of Responsible Person                Printed Name of Responsible Person                                      ____________________________________  _____________________________                ________________________________  Signature of Witness        Date  Time         Relationship to Patient    STATEMENT OF PHYSICIAN My signature below affirms that prior to the time of the procedure; I have explained to the patient and/or his/her legal representative, the risks and benefits involved in the proposed treatment and any reasonable alternative to the proposed treatment. I have also explained the risks and benefits involved in refusal of the proposed treatment and alternatives to the proposed treatment and have answered the  patient's questions. If I have a significant financial interest in a co-management agreement or a significant financial interest in any product or implant, or other significant relationship used in this procedure/surgery, I have disclosed this and had a discussion with my patient.     _____________________________________________________              _____________________________  (Signature of Physician)                                                                                         (Date)                                   (Time)  Patient Name: Mica ROTHMAN Florecita      : 1989      Printed: 3/18/2025     Medical Record #: [id number]                                      Page 1 of 1

## 2019-01-09 NOTE — NUR
PT WITH 1 BM TODAY. NO SIGNS AND SYMPTOMS OF PAIN AT THIS TIME. NO COMPLAINTS. WILL CONTINUE 
TO MONITOR

## 2019-01-09 NOTE — NUR
PT RESTING IN BED. ALL NEEDS MET AT THIS TIME. WILL CONTINUE TO MONITOR PT CLOSELY. CALL 
LIGHT WITHIN REACH, BED IN LOWEST POSITION.

## 2019-01-10 NOTE — NUR
Called Home Health Agencies [facility] and 
they don't accept [place].  Called other home health agencies and no answer. Left messages 
and will update if they will accept [employer] insurance.

## 2019-09-30 NOTE — NUR
Patient being evaluated by DR. CORDERO at bedside. Subjective:      Mikel Bazan is a 3 y.o. male here with mother. Patient brought in for Fever      History of Present Illness:  This 3 year old is here for ER follow up.  He had been seen in the clinic 9/11/19 and in the ER 9/12/19.  CBC was reassuring, CXR was negative.  He continues to run fever (103 this morning).  His mother states that he feels bad and has decreased activity when the fever is high.  He's been crying at night.  He is more active when his fever comes down after tylenol or motrin.      Review of Systems   Constitutional: Positive for activity change, appetite change and fever.   HENT: Positive for sore throat. Negative for congestion and rhinorrhea.    Eyes: Negative for discharge.   Respiratory: Negative for cough.    Gastrointestinal: Negative for abdominal pain, constipation, diarrhea and vomiting.   Genitourinary: Negative for decreased urine volume.   Skin: Negative for rash.   Neurological: Negative for headaches.       Objective:     Physical Exam   Constitutional: He is active.   No distress   HENT:   Right Ear: Tympanic membrane normal.   Left Ear: Tympanic membrane normal.   Nose: Nasal discharge present.   Mouth/Throat: Mucous membranes are moist. Pharynx is abnormal (mild erythema).   Eyes: Pupils are equal, round, and reactive to light. Conjunctivae are normal.   Cardiovascular: Normal rate, regular rhythm, S1 normal and S2 normal.   No murmur heard.  Pulmonary/Chest: Effort normal and breath sounds normal.   Abdominal: Soft. Bowel sounds are normal. He exhibits no mass. There is no hepatosplenomegaly. There is no tenderness.   Musculoskeletal: He exhibits no edema.   Neurological: He is alert.   Non-focal   Skin: Skin is warm. No rash noted.       Assessment:        1. Viral syndrome         Plan:     Problem List Items Addressed This Visit     None      Visit Diagnoses     Viral syndrome    -  Primary          Continue tylenol and motrin    Symptomatic measures  Call with  any new or worsening problems  Follow up as needed

## 2024-09-04 NOTE — NUR
Patient discharged with v/s stable. Written and verbal after care instructions 
given and explained. 

Patient alert, oriented and verbalized understanding of instructions. 
Ambulatory with steady gait. All questions addressed prior to discharge. ID 
band removed. Patient advised to follow up with PMD. Rx of TYLENOL NO.3, 
NAPROSYN 500 MG, KEFLEX 500 MG given. Patient educated on indication of 
medication including possible reaction and side effects. Opportunity to ask 
questions provided and answered. [FreeTextEntry1] : EXAMINATION OF THE CERVICAL SPINE AND UPPER EXTREMITIES: Pt is aware and alert and answered all questions cooperatively.  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  Smell and taste were not tested.   Visual fields were full.   There was no difficulty with finger to nose response.   Romberg testing was negative.   There was no dysmetria of the upper extremities.  Reflexes revealed 2's and symmetrical    Manual muscle testing upper extremities is intact.  Sensory examination revealed decrease sensation involving right C6 dermtome distribution  Vibratory and proprioceptive testing were intact.   Peripheral pulses were palpable bilaterally.   Lee Test was negative.   Tinel's Test was negative at the wrists bilaterally.   The Spurling Cervical Compression Test was positive.  The Adson's Maneuver was negative bilaterally.   No scapular winging was noted.   There was good scapular mobility.    Range of motion testing was performed with the use of a goniometer.  On range of motion testing, flexion was to 40 degrees (normal - 45), extension was to 30 degrees  (normal - 55), right rotation was to 35 degrees (normal - 70), left rotation was to 55 degrees  (normal - 70), right lateral bending was to 20 degrees  (normal - 40), and left lateral bending was to 25 degrees  (normal - 40).  On palpation, of the cervical spine there was tenderness involving the right cervical paraspinal musculature Trigger points involving the right trapezius. Tenderness and spasm involving the right levator scapulae musculature   EXAMINATION OF LUMBAR SPINE & LOWER EXTREMITIES:   Reflexes (R) L/E:                   Quadriceps 2                   Achilles 2 Reflexes (L) L/E:                    Quadriceps 2                    Achilles 2   Sensory L/E: intact      Good Peripheral Pulses Bilateral L/E  Testing: Patricks (R) [- ]               Patricks (L) [- ]               Babinski [ down going bilaterally]               Chaddock [ -bilaterally]               [place] [- bilaterally]               Gonda [- bilaterally]               Clonus [ -ankle bilaterally]               Leseagues (R) [- ]               Leseagues (L) [ -]               Kemps [- ] SI Jt Lig.Challenege Test (R) +  SI Jt Lig.Challenege Test (L) +  S.L.R. ( R ) - 60 degrees with hamstring tightness  S.L.R. ( L )- 60 degrees with hamstring tightness  Range of motion testing was performed with the use of a goniometer.                           Flexion: 55 degrees (normal - 75-90)                           Extension: 15 degrees (normal - 30)                           Lateral Bending ( R ): 30 degrees  (normal - 35)                           Lateral Bending ( L ): 25 degrees (normal - 35)                           Thoracic Rotation ( R ):30 degrees (normal - 35)                           Thoracic Rotation ( L ): 30 degrees (normal - 35)                           Internal Rotation Femur ( R ): WNL                           External Rotation Femur ( R ): WNL                           Internal Rotation Femur ( L ): mild restriction                            External Rotation Femur ( L ): WNL  Vibratory: intact Proprioception: intact  MMT: intact  Palpation of the Lumbar Spine: Tenderness involving the L4/L5 L5/S1 interspace. Tenderness and spasm involving the bilateral lower lumbar paraspinal musculature. Tenderness involving the bilateral SI jt Trigger points involving the right gluteal musculature.